# Patient Record
Sex: FEMALE | URBAN - METROPOLITAN AREA
[De-identification: names, ages, dates, MRNs, and addresses within clinical notes are randomized per-mention and may not be internally consistent; named-entity substitution may affect disease eponyms.]

---

## 2017-02-16 ENCOUNTER — OFFICE VISIT (OUTPATIENT)
Dept: ORTHOPEDIC SURGERY | Age: 27
End: 2017-02-16

## 2017-02-16 VITALS — HEIGHT: 69 IN | BODY MASS INDEX: 22.51 KG/M2 | WEIGHT: 152 LBS

## 2017-02-16 DIAGNOSIS — M22.2X1 PATELLOFEMORAL SYNDROME OF RIGHT KNEE: Primary | ICD-10-CM

## 2017-02-16 PROCEDURE — 99213 OFFICE O/P EST LOW 20 MIN: CPT | Performed by: ORTHOPAEDIC SURGERY

## 2024-12-19 ENCOUNTER — OFFICE VISIT (OUTPATIENT)
Age: 34
End: 2024-12-19

## 2024-12-19 VITALS
TEMPERATURE: 98.7 F | BODY MASS INDEX: 23.4 KG/M2 | OXYGEN SATURATION: 97 % | SYSTOLIC BLOOD PRESSURE: 123 MMHG | HEART RATE: 73 BPM | HEIGHT: 69 IN | RESPIRATION RATE: 16 BRPM | WEIGHT: 158 LBS | DIASTOLIC BLOOD PRESSURE: 77 MMHG

## 2024-12-19 DIAGNOSIS — A64 STD (FEMALE): Primary | ICD-10-CM

## 2024-12-19 NOTE — PROGRESS NOTES
[facility]    Michelle Marquez (:  1990 MRN: [id number]) is a 34 y.o. female, here for evaluation of the following chief complaint(s):  Sexually Transmitted Diseases (Pt is here for std testing, no symptoms)    ASSESSMENT/PLAN:    ICD-10-CM    1. STD (female)  A64 C.trachomatis N.gonorrhoeae DNA, Urine     VAGINITIS PANEL PCR          New Prescriptions    No medications on file     Summary  - I recommended abstinence from intercourse for at least 1 week and only when symptoms have completely resolved. I also encouraged re-testing in near future to confirm absence of infection.  - The patient declined a  examination.  - Follow up discussed and will be on an as-needed basis.  Differentials: pyelonephritis, Cystitis, Urethritis, Chlamydia, Gonorrhea, Trichomoniasis, Candidal Infection, Bacterial Vaginosis, Pelvis inflammatory disease.    SUBJECTIVE/OBJECTIVE:  JACKY Martinez presents to the clinic with concerns regarding a possible genitourinary infection. She does not have any symptoms.  She is here to get tested out of precautions.    She denies a history of previous STDs.       Vitals:    24 1058   BP: 123/77   Pulse: 73   Resp: 16   Temp: 98.7 °F (37.1 °C)   SpO2: 97%   Weight: 71.7 kg (158 lb)   Height: 1.753 m (5' 9\")       No results found for this visit on 24.     Review of Systems  PHYSICAL EXAM    Constitutional:  Well developed, well nourished, no acute distress, non-toxic appearance   Eyes:  PERRL, conjunctiva normal   HENT:  Atraumatic, external ears normal, nose normal, oropharynx moist. Neck supple   Respiratory:  No respiratory distress, normal breath sounds   GI:  No guarding, bowel sounds present x 4, abdomen is non-tender, no CVA tenderness.   : Patient declined exam   Musculoskeletal:  No edema   Integument:  Well hydrated   Neurologic:  AAO x 3, no focal deficits     An electronic signature was used to authenticate this note.    Kash Goode, APRN - CNP

## 2024-12-20 LAB
BV BACTERIA DNA VAG QL NAA+PROBE: DETECTED
C GLABRATA DNA VAG QL NAA+PROBE: ABNORMAL
C GLABRATA DNA VAG QL NAA+PROBE: NOT DETECTED
C KRUSEI DNA VAG QL NAA+PROBE: NOT DETECTED
C TRACH DNA UR QL NAA+PROBE: NEGATIVE
CANDIDA DNA VAG QL NAA+PROBE: NOT DETECTED
N GONORRHOEA DNA UR QL NAA+PROBE: NEGATIVE
T VAGINALIS DNA VAG QL NAA+PROBE: NOT DETECTED

## 2024-12-20 RX ORDER — METRONIDAZOLE 500 MG/1
500 TABLET ORAL 2 TIMES DAILY
Qty: 14 TABLET | Refills: 0 | Status: SHIPPED | OUTPATIENT
Start: 2024-12-20 | End: 2024-12-27

## 2025-01-15 ENCOUNTER — OFFICE VISIT (OUTPATIENT)
Age: 35
End: 2025-01-15

## 2025-01-15 VITALS
HEART RATE: 74 BPM | DIASTOLIC BLOOD PRESSURE: 76 MMHG | OXYGEN SATURATION: 98 % | SYSTOLIC BLOOD PRESSURE: 121 MMHG | RESPIRATION RATE: 16 BRPM | TEMPERATURE: 98.6 F

## 2025-01-15 DIAGNOSIS — A64 STD (FEMALE): Primary | ICD-10-CM

## 2025-01-15 RX ORDER — CEFTRIAXONE 500 MG/1
500 INJECTION, POWDER, FOR SOLUTION INTRAMUSCULAR; INTRAVENOUS ONCE
Status: COMPLETED | OUTPATIENT
Start: 2025-01-15 | End: 2025-01-15

## 2025-01-15 RX ADMIN — CEFTRIAXONE 500 MG: 500 INJECTION, POWDER, FOR SOLUTION INTRAMUSCULAR; INTRAVENOUS at 11:50

## 2025-01-15 ASSESSMENT — ENCOUNTER SYMPTOMS: ABDOMINAL PAIN: 0

## 2025-01-15 NOTE — PATIENT INSTRUCTIONS
-Check My Chart for your results - should result in 1-2 days  -Follow up with your PCP as needed.    -If your symptoms worsen, go to the nearest Emergency Department

## 2025-01-15 NOTE — PROGRESS NOTES
normal.         Chart reviewed - reviewed urgent care visit from 12/19/24 -  seen for STD testing. GC negative. Vaginitis panel positive for BV.  Treated with flagyl      An electronic signature was used to authenticate this note.    --LEONEL PhillipsC

## 2025-01-16 LAB
BV BACTERIA DNA VAG QL NAA+PROBE: NOT DETECTED
C GLABRATA DNA VAG QL NAA+PROBE: NORMAL
C GLABRATA DNA VAG QL NAA+PROBE: NOT DETECTED
C KRUSEI DNA VAG QL NAA+PROBE: NOT DETECTED
C TRACH DNA UR QL NAA+PROBE: NEGATIVE
CANDIDA DNA VAG QL NAA+PROBE: NOT DETECTED
N GONORRHOEA DNA UR QL NAA+PROBE: NEGATIVE
T VAGINALIS DNA VAG QL NAA+PROBE: NOT DETECTED

## 2025-07-10 ENCOUNTER — OFFICE VISIT (OUTPATIENT)
Age: 35
End: 2025-07-10

## 2025-07-10 VITALS
SYSTOLIC BLOOD PRESSURE: 121 MMHG | OXYGEN SATURATION: 97 % | HEIGHT: 69 IN | BODY MASS INDEX: 23.61 KG/M2 | TEMPERATURE: 98.9 F | DIASTOLIC BLOOD PRESSURE: 77 MMHG | WEIGHT: 159.4 LBS | HEART RATE: 80 BPM

## 2025-07-10 DIAGNOSIS — A64 STD (FEMALE): Primary | ICD-10-CM

## 2025-07-10 RX ORDER — LEVONORGESTREL 52 MG/1
INTRAUTERINE DEVICE INTRAUTERINE ONCE
COMMUNITY

## 2025-07-10 RX ORDER — NORETHINDRONE 5 MG/1
2.5 TABLET ORAL DAILY
COMMUNITY
Start: 2025-03-20 | End: 2026-03-20

## 2025-07-10 NOTE — PROGRESS NOTES
Michelle Marquez (: 1990) is a 34 y.o. female, Established patient, here for evaluation of the following chief complaint(s):  Sexually Transmitted Diseases (Patient here requesting to be tested for STDs and BV, no current symptoms at this time. )      ASSESSMENT/PLAN:    ICD-10-CM    1. STD (female)  A64 Vaginitis Panel PCR     Chlamydia, Gonorrhea, Trichomoniasis     Vaginitis Panel PCR     Chlamydia, Gonorrhea, Trichomoniasis            Discussed PCP follow up for persisting or worsening symptoms, or to return to the clinic if unable to obtain PCP follow up for worsening symptoms.    Reviewed AVS with treatment instructions and answered questions - patient acknowledges understanding and agreement with the discussed treatment plan and AVS instructions.          SUBJECTIVE/OBJECTIVE:  Patient states she has an open lifestyle with multiple sexual partners. Patient reports she gets tested for these every three months.  Patient has not done HIV or hepatitis screens discussed this and she will F/U with her PCP.      Sexually Transmitted Diseases       VITAL SIGNS  Vitals:    07/10/25 1358   BP: 121/77   BP Site: Left Upper Arm   Patient Position: Sitting   BP Cuff Size: Large Adult   Pulse: 80   Temp: 98.9 °F (37.2 °C)   TempSrc: Temporal   SpO2: 97%   Weight: 72.3 kg (159 lb 6.4 oz)   Height: 1.753 m (5' 9\")       Review of Systems   All other systems reviewed and are negative.    Pertinent positives and negatives as above, or may be included within the HPI.    Physical Exam  Constitutional:       Appearance: Normal appearance.   HENT:      Head: Normocephalic.   Cardiovascular:      Rate and Rhythm: Normal rate and regular rhythm.      Heart sounds: Normal heart sounds.   Pulmonary:      Effort: Pulmonary effort is normal.      Breath sounds: Normal breath sounds.   Skin:     General: Skin is warm and dry.   Neurological:      General: No focal deficit present.      Mental Status: She is alert and oriented

## 2025-07-10 NOTE — PROGRESS NOTES
Michelle Marquez (: 1990) is a 34 y.o. female, Established patient, here for evaluation of the following chief complaint(s):  Sexually Transmitted Diseases (Patient here requesting to be tested for STDs and BV, no current symptoms at this time. )      ASSESSMENT/PLAN:  No diagnosis found.      Discussed PCP follow up for persisting or worsening symptoms, or to return to the clinic if unable to obtain PCP follow up for worsening symptoms.    Reviewed AVS with treatment instructions and answered questions - patient acknowledges understanding and agreement with the discussed treatment plan and AVS instructions.          SUBJECTIVE/OBJECTIVE:  HPI  VITAL SIGNS  Vitals:    07/10/25 1358   BP: 121/77   BP Site: Left Upper Arm   Patient Position: Sitting   BP Cuff Size: Large Adult   Pulse: 80   Temp: 98.9 °F (37.2 °C)   TempSrc: Temporal   SpO2: 97%   Weight: 72.3 kg (159 lb 6.4 oz)   Height: 1.753 m (5' 9\")       Review of Systems  Pertinent positives and negatives as above, or may be included within the HPI.    Physical Exam    PROCEDURES:  Unless otherwise noted below, none     Procedures    RESULTS:  No results found for this visit on 07/10/25.      An electronic signature was used to authenticate this note.  --MARK Wynn - CNP

## 2025-07-11 LAB
BV BACTERIA DNA VAG QL NAA+PROBE: DETECTED
C GLABRATA DNA VAG QL NAA+PROBE: ABNORMAL
C GLABRATA DNA VAG QL NAA+PROBE: NOT DETECTED
C KRUSEI DNA VAG QL NAA+PROBE: NOT DETECTED
CANDIDA DNA VAG QL NAA+PROBE: NOT DETECTED
T VAGINALIS DNA VAG QL NAA+PROBE: NOT DETECTED

## 2025-07-12 LAB
C TRACH DNA UR QL NAA+PROBE: NEGATIVE
N GONORRHOEA DNA UR QL NAA+PROBE: NEGATIVE